# Patient Record
(demographics unavailable — no encounter records)

---

## 2025-01-21 NOTE — HISTORY OF PRESENT ILLNESS
[de-identified] : 77-year-old woman returns for annual follow-up with continued complaints of low back and groin pain.  She has a orthopedic history significant for bilateral hip fractures.  She underwent a right hemiarthroplasty for fracture December 11, 2022 with me in a left hemiarthroplasty for hip fracture with Dr. Rodriguez in 2023.  She continues to have groin pain lateral hip pain and low back pain.  She has been in physical therapy.  Patient denies any fevers or drainage.  Still walks with a walker.  Feels that she is benefiting from therapy.  Was last seen in my office in February 2024.

## 2025-01-21 NOTE — ASSESSMENT
[FreeTextEntry1] : 77-year-old woman she is now 3 years status post right hip hemiarthroplasty 2 years status post left hip hemiarthroplasty.  I think much of her complaints of pain despite her feelings is coming from her back.  I am happy to give her 1 last round of physical therapy and then we will refer her to pain management or her primary medical doctor.  I think she would benefit from possible selective cortisone injections in her lower back.  If she wants to go on physical therapy indefinitely I will defer to her primary medical doctor who going forward can oversee these prescriptions.  Her new primary medical doctor is Kye Milligan at [address] [place] [address].

## 2025-01-21 NOTE — IMAGING
[de-identified] : Late middle-aged woman sits reasonably comfortably my office.  Walks with a walker and unsteady gait.  She is accompanied by her son.  Physical examination: Bilateral hips: No tenderness palpation about surgical sites.  No tenderness in inguinal crease.  Mild tenderness over the trochanteric bursa.  Localizes discomfort to her buttocks and lower back mostly around the sacroiliac joint.  I am able to internally and externally rotate her hips without pain.  Flexion up to 90 degrees without pain.  Rotational movement is painless at full extension and at 90 degrees of flexion.  Radiographs: Bilateral hips (AP, lateral): Well-seated hemiarthroplasty stems.  No evidence of loosening or subsidence.  There is narrowing of the hip joint space.  Also seen is the lower portion of the lumbar spine which demonstrated degenerative disc disease with facet arthritis.

## 2025-05-23 NOTE — HISTORY OF PRESENT ILLNESS
[Home] : at home, [unfilled] , at the time of the visit. [Medical Office: ([facility])___] : at the medical office located in  [Telephone (audio)] : This telephonic visit was provided via audio only technology. [Patient preference] : patient preference. [FreeTextEntry1] : 76 y/o female with a PMHx of HTN, frequent falls, h/o provoked DVT post L hip fracture and repair, no longer on AC, cholelithiasis who was recently admitted for cholangitis and had ERCP on 3/19/25 with sphincterotomy, removal of stone, pus and placement of a plastic CBD stone, called for a f/u appt.   She has been doing well since her hospital discharge with no new complaints.   She is following with surgery and is being planned for cholecystectomy next week. She denies any episodes of abdominal pain, fevers, chills or confusion. She is consuming a regular diet and has no complaints. Otherwise, she has no GI symptoms.  [de-identified] : 03/19/25

## 2025-05-23 NOTE — ASSESSMENT
[FreeTextEntry1] : 76 y/o female with a PMHx of HTN, frequent falls, h/o provoked DVT post L hip fracture and repair, no longer on AC, cholelithiasis who was recently admitted for cholangitis and had ERCP on 3/19/25 with sphincterotomy, removal of stone, pus and placement of a plastic CBD stone, called for a f/u appt.   Patient wants to get ERCP with stent removal at the time of her cholecystectomy.  She would like to avoid exposure to anesthesia twice and that is reasonable.  However, she was advised that the operating room setting for ERCP is not ideal and she would be better served if the procedure is done in the endoscopy unit.  I recommended doing ERCP in the endoscopy unit with MAC on the same day as her ERCP.  Will get surgery consent before the procedure and she may proceed to the OR after her ERCP. She will follow-up with us after the procedure.

## 2025-06-19 NOTE — PLAN
[FreeTextEntry1] : KRISTOPHER drain removed today in clinic Return precautions discussed with pt and her son Letter given to pt's son for work

## 2025-06-19 NOTE — HISTORY OF PRESENT ILLNESS
[de-identified] : 77 F s/p robotic assisted subtotal cholecystectomy. Presents to clinic with son for follow up. Reports feeling well. Denies any nausea/vomiting. Denies any fever/chills. Denies any chest pain or SOB. Reports able to tolerate a diet. Reports the KRISTOPHER drain has been having minimal output.

## 2025-06-19 NOTE — PHYSICAL EXAM
[Normal Breath Sounds] : Normal breath sounds [Normal Heart Sounds] : normal heart sounds [de-identified] : frail appearing elderly female, in chair, comfortable  [de-identified] : soft, non-tender, non-distended, KRISTOPHER drain in place with minimal serous output

## 2025-07-15 NOTE — ASSESSMENT
[FreeTextEntry1] : 77 F with right sided abdominal pain post subtotal cholecystectomy and ERCP with CBD stent removal >3 weeks prior.

## 2025-07-15 NOTE — HISTORY OF PRESENT ILLNESS
[de-identified] : 77 F s/p subtotal cholecystectomy and ERCP with CBD stent removal. Presents to clinic accompanied by her son. Reports having right sided abdominal discomfort on and off after eating. Reports pain last 1-2 hrs after any meal. No specific food triggers the pain. Denies any nausea/vomiting. Denies any fever/chills. Ambulating with her cane.

## 2025-07-15 NOTE — PLAN
[FreeTextEntry1] : Will send pt for hepatic functional panel, BMP Will obtain a CT of the abdomen/pelvis to evaluate the RUQ region

## 2025-07-15 NOTE — PHYSICAL EXAM
[Normal Breath Sounds] : Normal breath sounds [Normal Heart Sounds] : normal heart sounds [de-identified] : frail appearing elderly female, in chair, comfortable  [de-identified] : soft, non-tender, non-distended, port-sites clean and dry, well healed